# Patient Record
Sex: MALE | Race: WHITE | URBAN - METROPOLITAN AREA
[De-identification: names, ages, dates, MRNs, and addresses within clinical notes are randomized per-mention and may not be internally consistent; named-entity substitution may affect disease eponyms.]

---

## 2017-10-09 ENCOUNTER — OFFICE VISIT (OUTPATIENT)
Dept: FAMILY MEDICINE CLINIC | Age: 32
End: 2017-10-09

## 2017-10-09 VITALS
OXYGEN SATURATION: 99 % | HEIGHT: 69 IN | BODY MASS INDEX: 25.18 KG/M2 | SYSTOLIC BLOOD PRESSURE: 138 MMHG | TEMPERATURE: 97.6 F | WEIGHT: 170 LBS | HEART RATE: 80 BPM | RESPIRATION RATE: 14 BRPM | DIASTOLIC BLOOD PRESSURE: 92 MMHG

## 2017-10-09 DIAGNOSIS — S29.012A STRAIN OF THORACIC PARASPINAL MUSCLES EXCLUDING T1 AND T2 LEVELS, INITIAL ENCOUNTER: Primary | ICD-10-CM

## 2017-10-09 RX ORDER — NAPROXEN 500 MG/1
500 TABLET ORAL 2 TIMES DAILY WITH MEALS
Qty: 20 TAB | Refills: 2 | Status: SHIPPED | OUTPATIENT
Start: 2017-10-09

## 2017-10-09 RX ORDER — METHOCARBAMOL 750 MG/1
750 TABLET, FILM COATED ORAL 3 TIMES DAILY
Qty: 30 TAB | Refills: 0 | Status: SHIPPED | OUTPATIENT
Start: 2017-10-09

## 2017-10-09 RX ORDER — BUPROPION HYDROCHLORIDE 150 MG/1
150 TABLET ORAL
COMMUNITY
Start: 2017-03-20

## 2017-10-09 NOTE — MR AVS SNAPSHOT
Visit Information Date & Time Provider Department Dept. Phone Encounter #  
 10/9/2017 11:45 AM Harmony Paul MD [facility] [phone] [id number] Follow-up Instructions Return if symptoms worsen or fail to improve. Upcoming Health Maintenance Date Due DTaP/Tdap/Td series (1 - Tdap) 4/26/2005 INFLUENZA AGE 9 TO ADULT 8/1/2017 Allergies as of 10/9/2017  Review Complete On: 10/9/2017 By: Siddharth Colbert LPN Severity Noted Reaction Type Reactions Citrus And Derivatives  01/25/2016    Other (comments) Ulcers and sores in throat Current Immunizations  Never Reviewed No immunizations on file. Not reviewed this visit You Were Diagnosed With   
  
 Codes Comments Strain of thoracic paraspinal muscles excluding T1 and T2 levels, initial encounter    -  Primary ICD-10-CM: V24.851W ICD-9-CM: 847.1 Vitals BP Pulse Temp Resp Height(growth percentile) Weight(growth percentile) (!) 138/92 (BP 1 Location: Left arm, BP Patient Position: Sitting) 80 97.6 °F (36.4 °C) (Oral) 14 5' 9\" (1.753 m) 170 lb (77.1 kg) SpO2 BMI Smoking Status 99% 25.1 kg/m2 Former Smoker BMI and BSA Data Body Mass Index Body Surface Area  
 25.1 kg/m 2 1.94 m 2 Preferred Pharmacy Pharmacy Name Phone [facility] [address] [phone] Your Updated Medication List  
  
   
This list is accurate as of: 10/9/17 12:08 PM.  Always use your most recent med list.  
  
  
  
  
 buPROPion  mg tablet Commonly known as:  WELLBUTRIN XL  
150 mg.  
  
 doxepin 25 mg capsule Commonly known as:  SINEquan Take 1 Cap by mouth nightly. methocarbamol 750 mg tablet Commonly known as:  ROBAXIN Take 1 Tab by mouth three (3) times daily. Indications: Muscle Spasm  
  
 naproxen 500 mg tablet Commonly known as:  NAPROSYN  
 Take 1 Tab by mouth two (2) times daily (with meals). Prescriptions Sent to Pharmacy Refills  
 naproxen (NAPROSYN) 500 mg tablet 2 Sig: Take 1 Tab by mouth two (2) times daily (with meals). Class: Normal  
 Pharmacy: [facility] [address] [place] [address] Ph #: [phone] Route: Oral  
 methocarbamol (ROBAXIN) 750 mg tablet 0 Sig: Take 1 Tab by mouth three (3) times daily. Indications: Muscle Spasm Class: Normal  
 Pharmacy: [facility] [address] [place] [address] Ph #: [phone] Route: Oral  
  
Follow-up Instructions Return if symptoms worsen or fail to improve. Patient Instructions Take the medications regularly today, then as needed starting tomorrow. Heat and light massage are good now. Recheck 2-3 days if it's not getting better. Introducing [facility]! Qian Garrett introduces [employer] patient portal. Now you can access parts of your medical record, email your doctor's office, and request medication refills online. 1. In your internet browser, go to [email or web address] 2. Click on the First Time User? Click Here link in the Sign In box. You will see the New Member Sign Up page. 3. Enter your [employer] Access Code exactly as it appears below. You will not need to use this code after youve completed the sign-up process. If you do not sign up before the expiration date, you must request a new code. · [employer] Access Code: [email or web address] Expires: 1/7/2018 12:08 PM 
 
4. Enter the last four digits of your Social Security Number (xxxx) and Date of Birth (mm/dd/yyyy) as indicated and click Submit. You will be taken to the next sign-up page. 5. Create a [employer] ID. This will be your [employer] login ID and cannot be changed, so think of one that is secure and easy to remember. 6. Create a [employer] password. You can change your password at any time. 7. Enter your Password Reset Question and Answer. This can be used at a later time if you forget your password. 8. Enter your e-mail address. You will receive e-mail notification when new information is available in [address] 9. Click Sign Up. You can now view and download portions of your medical record. 10. Click the Download Summary menu link to download a portable copy of your medical information. If you have questions, please visit the Frequently Asked Questions section of the [employer] website. Remember, [employer] is NOT to be used for urgent needs. For medical emergencies, dial 911. Now available from your iPhone and Android! Please provide this summary of care documentation to your next provider. If you have any questions after today's visit, please call [phone].

## 2017-10-09 NOTE — PATIENT INSTRUCTIONS
Take the medications regularly today, then as needed starting tomorrow. Heat and light massage are good now. Recheck 2-3 days if it's not getting better.

## 2017-10-09 NOTE — PROGRESS NOTES
Patient presents to the clinic for right side mid back pain that began 3 days ago. Patient complains of pain when turning his neck. Patient states he tried Advil and received little relief.

## 2017-10-09 NOTE — PROGRESS NOTES
HISTORY OF PRESENT ILLNESS  Layla Abdullahi is a 34 y.o. male. HPI Comments: Two days ago Arnel Solorio was moving an old school tv (very heavy) and hurt his back. Pain is in his R scapular area and medial to it. Pain worsens with twisting his back and turning his neck. He iced up the back initially and again most of yesterday. He denies SOB, numbness or tingling anywhere. Back Pain    Pertinent negatives include no chest pain, no fever, no headaches and no abdominal pain. Review of Systems   Constitutional: Negative for chills and fever. HENT: Negative for sore throat. Eyes: Negative for blurred vision and double vision. Respiratory: Negative for cough and shortness of breath. Cardiovascular: Negative for chest pain and palpitations. Gastrointestinal: Negative for abdominal pain, nausea and vomiting. Musculoskeletal: Positive for back pain and myalgias. Negative for falls, joint pain and neck pain. Neurological: Negative for dizziness and headaches. Physical Exam   Constitutional: He appears well-developed and well-nourished. HENT:   Right Ear: Tympanic membrane, external ear and ear canal normal.   Left Ear: Tympanic membrane, external ear and ear canal normal.   Nose: Nose normal.   Mouth/Throat: Uvula is midline, oropharynx is clear and moist and mucous membranes are normal. No posterior oropharyngeal erythema. Eyes: Conjunctivae are normal. Pupils are equal, round, and reactive to light. Right conjunctiva is not injected. Left conjunctiva is not injected. Neck: Neck supple. No thyromegaly present. Cardiovascular: Normal rate and regular rhythm. Pulmonary/Chest: Effort normal and breath sounds normal. No respiratory distress. He has no wheezes. He has no rales. Abdominal: He exhibits no distension. There is no tenderness. There is no rebound. Musculoskeletal:   Back is slightly tender in the R-sided paravertebral muscles. No appreciable swelling.  No rash Lymphadenopathy:     He has no cervical adenopathy. Skin: No rash noted. Nursing note and vitals reviewed.       ASSESSMENT and PLAN    ICD-10-CM ICD-9-CM    1. Strain of thoracic paraspinal muscles excluding T1 and T2 levels, initial encounter S29.012A 847.1 buPROPion XL (WELLBUTRIN XL) 150 mg tablet      naproxen (NAPROSYN) 500 mg tablet      methocarbamol (ROBAXIN) 750 mg tablet       AVS instructions reviewed with patient, pt verbalized understanding

## 2024-06-04 ENCOUNTER — OFFICE VISIT (OUTPATIENT)
Age: 39
End: 2024-06-04
Payer: COMMERCIAL

## 2024-06-04 VITALS
BODY MASS INDEX: 24.8 KG/M2 | WEIGHT: 163.6 LBS | HEIGHT: 68 IN | HEART RATE: 100 BPM | SYSTOLIC BLOOD PRESSURE: 118 MMHG | OXYGEN SATURATION: 98 % | DIASTOLIC BLOOD PRESSURE: 72 MMHG

## 2024-06-04 DIAGNOSIS — R07.9 CHEST PAIN, UNSPECIFIED TYPE: Primary | ICD-10-CM

## 2024-06-04 DIAGNOSIS — R07.89 OTHER CHEST PAIN: ICD-10-CM

## 2024-06-04 DIAGNOSIS — F43.0 STRESS REACTION: ICD-10-CM

## 2024-06-04 DIAGNOSIS — R06.02 SOB (SHORTNESS OF BREATH): ICD-10-CM

## 2024-06-04 DIAGNOSIS — F90.9 ADULT ADHD: ICD-10-CM

## 2024-06-04 DIAGNOSIS — R00.0 TACHYCARDIA: ICD-10-CM

## 2024-06-04 PROCEDURE — 99244 OFF/OP CNSLTJ NEW/EST MOD 40: CPT | Performed by: SPECIALIST

## 2024-06-04 RX ORDER — BUPROPION HYDROCHLORIDE 150 MG/1
150 TABLET ORAL
COMMUNITY
Start: 2017-03-20 | End: 2024-06-04 | Stop reason: ALTCHOICE

## 2024-06-04 RX ORDER — DOXEPIN HYDROCHLORIDE 25 MG/1
CAPSULE ORAL
COMMUNITY

## 2024-06-04 RX ORDER — ATORVASTATIN CALCIUM 20 MG/1
20 TABLET, FILM COATED ORAL DAILY
COMMUNITY
Start: 2024-05-17

## 2024-06-04 ASSESSMENT — PATIENT HEALTH QUESTIONNAIRE - PHQ9
SUM OF ALL RESPONSES TO PHQ QUESTIONS 1-9: 0
SUM OF ALL RESPONSES TO PHQ9 QUESTIONS 1 & 2: 0
2. FEELING DOWN, DEPRESSED OR HOPELESS: NOT AT ALL
SUM OF ALL RESPONSES TO PHQ QUESTIONS 1-9: 0
1. LITTLE INTEREST OR PLEASURE IN DOING THINGS: NOT AT ALL
SUM OF ALL RESPONSES TO PHQ QUESTIONS 1-9: 0
SUM OF ALL RESPONSES TO PHQ QUESTIONS 1-9: 0

## 2024-06-04 ASSESSMENT — ENCOUNTER SYMPTOMS: SHORTNESS OF BREATH: 1

## 2024-06-04 NOTE — PROGRESS NOTES
HISTORY OF PRESENT ILLNESS  Yariel Thomas is a 39 y.o. male       He is seen for chest pain and shortness of breath in the setting of of anxiety.  His wife had a baby in September and then apparently either had postpartum depression or a severe reaction to a medication.  She was admitted to psychiatry for psychosis.  He did well at first with the new baby but when she came home he had a panic attack with severe chest pain and shortness of breath and was seen at an emergency room or urgent care center and released.  It happened again in March of this year.  He has had some shortness of breath since that time and his blood pressure has on occasion been high.  He is being considered for medication for ADHD.  He does about 10,000 steps a day and rides a bike 45 minutes 2 times a week.  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  He does vape.  He had an EKG done at a urgent care center and was told it was normal.  Hypertension  Associated symptoms include chest pain and shortness of breath.        Specialty Problems          Cardiology Problems    Other chest pain          Current Outpatient Medications   Medication Instructions    atorvastatin (LIPITOR) 20 mg, Oral, DAILY    doxepin (SINEQUAN) 25 MG capsule TAKE ONE CAPSULE AT BEDTIME    sertraline (ZOLOFT) 50 mg, Oral, DAILY      Allergies   Allergen Reactions    Gluten Diarrhea     IBS     History reviewed. No pertinent past medical history.  History reviewed. No pertinent surgical history.  History reviewed. No pertinent family history.  Social History     Tobacco Use    Smoking status: Never     Passive exposure: Never    Smokeless tobacco: Never   Substance Use Topics    Alcohol use: Not Currently       Review of Systems   Cardiovascular:  Positive for chest pain.   Respiratory:  Positive for shortness of breath.    Psychiatric/Behavioral:  The patient is nervous/anxious.    All other systems reviewed and are negative.       /72

## 2024-07-17 ENCOUNTER — TELEPHONE (OUTPATIENT)
Age: 39
End: 2024-07-17

## 2024-07-17 NOTE — TELEPHONE ENCOUNTER
----- Message from Don Castro MD sent at 7/17/2024 12:26 PM EDT -----  Stress echo normal, see back prn, OK to take meds for ADHD

## 2024-07-17 NOTE — TELEPHONE ENCOUNTER
Called pt. Verified patient's identity with two identifiers. notified pt of results and Dr. Castro's message. Pt requested letter stating this so he has it in writing to give his doctor. I told him we will write letter and offered to send to his provider. He will try to view letter in [employer] first and send to his provider himself, but will let us know if he can't and we'll send a copy. Patient verbalized understanding and denied further questions or concerns.

## 2024-10-22 ENCOUNTER — TELEPHONE (OUTPATIENT)
Age: 39
End: 2024-10-22

## 2024-10-22 NOTE — TELEPHONE ENCOUNTER
Patient called requesting cardiac clearance for a stimulant. Patient is seeing Dr. Tonia Connell with [facility] .     # [phone]  Fax # [phone]